# Patient Record
(demographics unavailable — no encounter records)

---

## 2025-02-09 NOTE — PHYSICAL EXAM
[Well Developed] : well developed [Well Nourished] : well nourished [NAD] : in no acute distress [Alert and Active] : alert and active [PERRL] : pupils were equal, round, reactive to light  [icteric] : anicteric [Moist & Pink Mucous Membranes] : moist and pink mucous membranes [Normal Oropharynx] : the oropharynx was normal [Oral Ulcers] : no oral ulcers [CTAB] : lungs clear to auscultation bilaterally [Respiratory Distress] : no respiratory distress  [Regular Rate and Rhythm] : regular rate and rhythm [Normal S1, S2] : normal S1 and S2 [Soft] : soft  [Distended] : non distended [Tender] : non tender [Normal Bowel Sounds] : normal bowel sounds [No HSM] : no hepatosplenomegaly appreciated [Normal Tone] : normal tone [Well-Perfused] : well-perfused [Edema] : no edema [Cyanosis] : no cyanosis [Rash] : no rash [Jaundice] : no jaundice [Interactive] : interactive [de-identified] : no perianal lesions

## 2025-02-09 NOTE — HISTORY OF PRESENT ILLNESS
[de-identified] : 3yo F  Full term  Passed meconium within 48hrs Stooled spontaneously once weekly as an infant  Rarely used suppositories (?maybe twice)  Has always stooled once weekly through toddlerhood  Prior to laxatives stooling once weekly, very hard, never saw blood  +withholding behaviors since toilet training, still not toilet training (turns 5yrs next month) Some associated distress with stooling   Ex Lax 1 square nightly, most days. Stopped Miralax because no effect unless very large dose  Currently stooling most days with Ex Lax, soft, no blood   Limiting eating when doesn't stool

## 2025-02-09 NOTE — CONSULT LETTER
[Dear  ___] : Dear  [unfilled], [Consult Letter:] : I had the pleasure of evaluating your patient, [unfilled]. [Please see my note below.] : Please see my note below. [Consult Closing:] : Thank you very much for allowing me to participate in the care of this patient.  If you have any questions, please do not hesitate to contact me. [Sincerely,] : Sincerely, [FreeTextEntry3] : Cielo Leary MD Attending Physician, Pediatric Gastroenterology [facility]

## 2025-02-09 NOTE — ASSESSMENT
[Educated Patient & Family about Diagnosis] : educated the patient and family about the diagnosis [FreeTextEntry1] : 5yo F no medical problems here for evaluation of constipation. Currently with significantly improved stooling pattern on low dose of senna. Discussed transition of Ex Lax administration from nighttime dose to morning dose and increasee to 1.5 squares. Discussed risks, benefits, administration and titration. Likely functional withholding behaviors.   Follow up 2 months

## 2025-02-09 NOTE — PHYSICAL EXAM
[Well Developed] : well developed [Well Nourished] : well nourished [NAD] : in no acute distress [Alert and Active] : alert and active [PERRL] : pupils were equal, round, reactive to light  [icteric] : anicteric [Moist & Pink Mucous Membranes] : moist and pink mucous membranes [Normal Oropharynx] : the oropharynx was normal [Oral Ulcers] : no oral ulcers [CTAB] : lungs clear to auscultation bilaterally [Respiratory Distress] : no respiratory distress  [Regular Rate and Rhythm] : regular rate and rhythm [Normal S1, S2] : normal S1 and S2 [Soft] : soft  [Distended] : non distended [Tender] : non tender [Normal Bowel Sounds] : normal bowel sounds [No HSM] : no hepatosplenomegaly appreciated [Normal Tone] : normal tone [Well-Perfused] : well-perfused [Edema] : no edema [Cyanosis] : no cyanosis [Rash] : no rash [Jaundice] : no jaundice [Interactive] : interactive [de-identified] : no perianal lesions

## 2025-02-09 NOTE — HISTORY OF PRESENT ILLNESS
[de-identified] : 5yo F  Full term  Passed meconium within 48hrs Stooled spontaneously once weekly as an infant  Rarely used suppositories (?maybe twice)  Has always stooled once weekly through toddlerhood  Prior to laxatives stooling once weekly, very hard, never saw blood  +withholding behaviors since toilet training, still not toilet training (turns 5yrs next month) Some associated distress with stooling   Ex Lax 1 square nightly, most days. Stopped Miralax because no effect unless very large dose  Currently stooling most days with Ex Lax, soft, no blood   Limiting eating when doesn't stool

## 2025-02-09 NOTE — REASON FOR VISIT
Quality 130: Documentation Of Current Medications In The Medical Record: Current Medications Documented Detail Level: Detailed Quality 110: Preventive Care And Screening: Influenza Immunization: Influenza Immunization not Administered for Documented Reasons. [Consultation] : a consultation visit [Mother] : mother [FreeTextEntry2] : constipation

## 2025-02-09 NOTE — PHYSICAL EXAM
[Well Developed] : well developed [Well Nourished] : well nourished [NAD] : in no acute distress [Alert and Active] : alert and active [PERRL] : pupils were equal, round, reactive to light  [icteric] : anicteric [Moist & Pink Mucous Membranes] : moist and pink mucous membranes [Normal Oropharynx] : the oropharynx was normal [Oral Ulcers] : no oral ulcers [CTAB] : lungs clear to auscultation bilaterally [Respiratory Distress] : no respiratory distress  [Regular Rate and Rhythm] : regular rate and rhythm [Normal S1, S2] : normal S1 and S2 [Soft] : soft  [Distended] : non distended [Tender] : non tender [Normal Bowel Sounds] : normal bowel sounds [No HSM] : no hepatosplenomegaly appreciated [Normal Tone] : normal tone [Well-Perfused] : well-perfused [Edema] : no edema [Cyanosis] : no cyanosis [Rash] : no rash [Jaundice] : no jaundice [Interactive] : interactive [de-identified] : no perianal lesions

## 2025-02-09 NOTE — CONSULT LETTER
[Dear  ___] : Dear  [unfilled], [Consult Letter:] : I had the pleasure of evaluating your patient, [unfilled]. [Please see my note below.] : Please see my note below. [Consult Closing:] : Thank you very much for allowing me to participate in the care of this patient.  If you have any questions, please do not hesitate to contact me. [Sincerely,] : Sincerely, [FreeTextEntry3] : Cielo Leary MD Attending Physician, Pediatric Gastroenterology [facility]

## 2025-02-09 NOTE — HISTORY OF PRESENT ILLNESS
Family [de-identified] : 3yo F  Full term  Passed meconium within 48hrs Stooled spontaneously once weekly as an infant  Rarely used suppositories (?maybe twice)  Has always stooled once weekly through toddlerhood  Prior to laxatives stooling once weekly, very hard, never saw blood  +withholding behaviors since toilet training, still not toilet training (turns 5yrs next month) Some associated distress with stooling   Ex Lax 1 square nightly, most days. Stopped Miralax because no effect unless very large dose  Currently stooling most days with Ex Lax, soft, no blood   Limiting eating when doesn't stool

## 2025-02-09 NOTE — CONSULT LETTER
[Dear  ___] : Dear  [unfilled], [Consult Letter:] : I had the pleasure of evaluating your patient, [unfilled]. [Please see my note below.] : Please see my note below. [Consult Closing:] : Thank you very much for allowing me to participate in the care of this patient.  If you have any questions, please do not hesitate to contact me. [Sincerely,] : Sincerely, [FreeTextEntry3] : Cielo Leary MD Attending Physician, Pediatric Gastroenterology [facility]

## 2025-04-07 NOTE — PHYSICAL EXAM
[Well Developed] : well developed [Well Nourished] : well nourished [NAD] : in no acute distress [PERRL] : pupils were equal, round, reactive to light  [icteric] : anicteric [Moist & Pink Mucous Membranes] : moist and pink mucous membranes [CTAB] : lungs clear to auscultation bilaterally [Respiratory Distress] : no respiratory distress  [Regular Rate and Rhythm] : regular rate and rhythm [Normal S1, S2] : normal S1 and S2 [Soft] : soft  [Distended] : non distended [Tender] : non tender [Normal Bowel Sounds] : normal bowel sounds [Stool Palpable] : no stool palpable [No HSM] : no hepatosplenomegaly appreciated [Normal Tone] : normal tone [Well-Perfused] : well-perfused [Edema] : no edema [Cyanosis] : no cyanosis [Rash] : no rash [Jaundice] : no jaundice [Interactive] : interactive

## 2025-04-07 NOTE — HISTORY OF PRESENT ILLNESS
[de-identified] : 4yo F no medical history here for follow up of constipation   Significant improvement (previously stooling once weekly) Taking Ex Lax once daily in the morning, 1 square  Stooling every day, [place] 3-4, no blood. Occasional [place] 2.  Eating well Progressing through toilet training, although still wearing pull ups in school and at ice skating  No nausea or vomiting

## 2025-04-07 NOTE — ASSESSMENT
[Educated Patient & Family about Diagnosis] : educated the patient and family about the diagnosis [FreeTextEntry1] : 4yo F with chronic constipation here for follow up. Now stooling once daily with change from nighttime to morning Ex Lax. Progressing well through toilet training now. Occasional hard stools. Discussed slight increase to 1.25 squares for consistently softer stools. Plan to continue Ex Lax through completion of toilet training.   Follow up 3 months

## 2025-04-07 NOTE — CONSULT LETTER
[Dear  ___] : Dear  [unfilled], [Consult Letter:] : I had the pleasure of evaluating your patient, [unfilled]. [Please see my note below.] : Please see my note below. [Consult Closing:] : Thank you very much for allowing me to participate in the care of this patient.  If you have any questions, please do not hesitate to contact me. [Sincerely,] : Sincerely, [FreeTextEntry3] : Cielo Leary MD Attending Physician, Pediatric Gastroenterology [facility]